# Patient Record
Sex: MALE | Race: BLACK OR AFRICAN AMERICAN | NOT HISPANIC OR LATINO | Employment: FULL TIME | URBAN - METROPOLITAN AREA
[De-identification: names, ages, dates, MRNs, and addresses within clinical notes are randomized per-mention and may not be internally consistent; named-entity substitution may affect disease eponyms.]

---

## 2023-09-17 ENCOUNTER — OFFICE VISIT (OUTPATIENT)
Dept: URGENT CARE | Facility: URGENT CARE | Age: 23
End: 2023-09-17
Payer: COMMERCIAL

## 2023-09-17 VITALS
TEMPERATURE: 97.3 F | SYSTOLIC BLOOD PRESSURE: 114 MMHG | HEIGHT: 68 IN | HEART RATE: 78 BPM | DIASTOLIC BLOOD PRESSURE: 62 MMHG | WEIGHT: 155 LBS | BODY MASS INDEX: 23.49 KG/M2 | OXYGEN SATURATION: 100 %

## 2023-09-17 DIAGNOSIS — T23.201A SECOND DEGREE BURN OF HAND, RIGHT, INITIAL ENCOUNTER: ICD-10-CM

## 2023-09-17 DIAGNOSIS — T23.202A SECOND DEGREE BURN OF HAND, LEFT, INITIAL ENCOUNTER: Primary | ICD-10-CM

## 2023-09-17 DIAGNOSIS — T25.221A SECOND DEGREE BURN OF FOOT, RIGHT, INITIAL ENCOUNTER: ICD-10-CM

## 2023-09-17 PROCEDURE — 99203 OFFICE O/P NEW LOW 30 MIN: CPT | Performed by: INTERNAL MEDICINE

## 2023-09-17 RX ORDER — NAPROXEN 500 MG/1
500 TABLET ORAL 2 TIMES DAILY WITH MEALS
Qty: 28 TABLET | Refills: 0 | Status: SHIPPED | OUTPATIENT
Start: 2023-09-17 | End: 2023-10-01

## 2023-09-17 NOTE — PATIENT INSTRUCTIONS
Ice baths for pain over next 2 days up to 10 minutes at a time.  Vasaline or silvadene cream daily.  Naprosyn 2 x day with food for pain.    Scarring can affect range of motion of feet/toes    Recheck 1 week with primary     Observe for infection.
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
